# Patient Record
Sex: FEMALE | Race: WHITE | NOT HISPANIC OR LATINO
[De-identification: names, ages, dates, MRNs, and addresses within clinical notes are randomized per-mention and may not be internally consistent; named-entity substitution may affect disease eponyms.]

---

## 2019-11-12 PROBLEM — Z00.00 ENCOUNTER FOR PREVENTIVE HEALTH EXAMINATION: Status: ACTIVE | Noted: 2019-11-12

## 2019-12-20 ENCOUNTER — APPOINTMENT (OUTPATIENT)
Dept: BARIATRICS | Facility: CLINIC | Age: 49
End: 2019-12-20
Payer: COMMERCIAL

## 2019-12-20 VITALS — SYSTOLIC BLOOD PRESSURE: 159 MMHG | HEART RATE: 79 BPM | DIASTOLIC BLOOD PRESSURE: 93 MMHG

## 2019-12-20 VITALS — HEIGHT: 62 IN | BODY MASS INDEX: 30.51 KG/M2 | WEIGHT: 165.8 LBS

## 2019-12-20 DIAGNOSIS — R53.83 OTHER FATIGUE: ICD-10-CM

## 2019-12-20 DIAGNOSIS — Z87.42 PERSONAL HISTORY OF OTHER DISEASES OF THE FEMALE GENITAL TRACT: ICD-10-CM

## 2019-12-20 DIAGNOSIS — G47.419 NARCOLEPSY W/OUT CATAPLEXY: ICD-10-CM

## 2019-12-20 PROCEDURE — 99205 OFFICE O/P NEW HI 60 MIN: CPT

## 2019-12-20 RX ORDER — ARMODAFINIL 200 MG/1
TABLET ORAL
Refills: 0 | Status: ACTIVE | COMMUNITY

## 2019-12-20 NOTE — HISTORY OF PRESENT ILLNESS
[FreeTextEntry1] : 49 year old female for medical weight loss consultation\par Patient has struggled with her weight for over 2 years\par Tried 21 day fix diet\par PMD- Dr. Evangelina Rivera at [facility]\par Sees a neurologist for narcolepsy \par Lowest adult weight 132\par HIghest weight 166\par Target weight 110 by IDW- d/w patient about healthy BMI weight 135\par Retired\par Exercise- stopped due to plantar fascitis which has resolved\par Water intake- adequate\par Food recall- B- turkey and lettuce D- grilled chicken salad- finds herself very hungry\par

## 2019-12-20 NOTE — ASSESSMENT
[FreeTextEntry1] : Patient will have labs drawn including 1mg dex suppresion test\par Behaivoral changes- will go to gym 3x a week, increase meals to 2-3x a daily with protein, limited complex carbs, and vegetables- possibility she isn't consuming enough during the day which is leading to feeling of severe hunger, will keep activity log to organize schedule and plan meals ahead of time\par If labs WNL can consider Saxenda- provided information to patient and disussed possible side effects including n/v/headache/GERD/pancreatitis/medullary thyroid cancer \par RTO 2 weeks

## 2020-01-09 ENCOUNTER — APPOINTMENT (OUTPATIENT)
Dept: BARIATRICS | Facility: CLINIC | Age: 50
End: 2020-01-09
Payer: COMMERCIAL

## 2020-01-09 VITALS
SYSTOLIC BLOOD PRESSURE: 142 MMHG | HEART RATE: 70 BPM | DIASTOLIC BLOOD PRESSURE: 86 MMHG | WEIGHT: 169 LBS | BODY MASS INDEX: 30.91 KG/M2

## 2020-01-09 PROCEDURE — 99214 OFFICE O/P EST MOD 30 MIN: CPT

## 2020-01-09 NOTE — HISTORY OF PRESENT ILLNESS
[FreeTextEntry1] : 49 year old female for medical weight loss consultation\par Patient has struggled with her weight for over 2 years\par Tried 21 day fix diet\par PMD- Dr. Evangelina Rivera at [facility]\par Sees a neurologist for narcolepsy \par Lowest adult weight 132\par HIghest weight 166\par Target weight 110 by IDW- d/w patient about healthy BMI weight 135\par Retired\par Exercise- stopped due to plantar fascitis which has resolved\par Water intake- adequate\par Food recall- B- turkey and lettuce D- grilled chicken salad- finds herself very hungry\par \par 1/9/20- Patient RTO but up 4 pounds.  Reports very strict diet- vegetable juicing with protein powder, chick peas, intermittent fasting.  Noticed some epigastric pain upon deep palpation.  Reviewed recent labs- WNL.  Denies signs of reflux, no edema, no diarrhea, constipation, nausea/vomiting.  +exercise intolerance.  \par

## 2020-01-09 NOTE — ASSESSMENT
[FreeTextEntry1] : Patient to f/u with her PMD for physical and to evaluate epigastric pain on deep palpation- may need further workup- discussed with patient at length \par Weight continues to increase- no signs of edema or secondary causes of weight gain\par Declined RD session\par Will start Saxenda- reviewed possible side effects including n/v/d/c/pancreatitis- provided medication information on last visit\par Patient injected herself in the office- pen teaching performed no reaction noted\par Sample [id number] Exp 5/2021\par RTO 1 month

## 2020-01-24 RX ORDER — PEN NEEDLE, DIABETIC 29 G X1/2"
32G X 4 MM NEEDLE, DISPOSABLE MISCELLANEOUS
Qty: 1 | Refills: 2 | Status: DISCONTINUED | COMMUNITY
Start: 2020-01-09 | End: 2020-01-24

## 2020-01-24 RX ORDER — DEXAMETHASONE 1 MG/1
1 TABLET ORAL
Qty: 1 | Refills: 0 | Status: DISCONTINUED | COMMUNITY
Start: 2019-12-20 | End: 2020-01-24

## 2020-01-24 RX ORDER — LIRAGLUTIDE 6 MG/ML
18 INJECTION, SOLUTION SUBCUTANEOUS
Qty: 1 | Refills: 5 | Status: DISCONTINUED | COMMUNITY
Start: 2020-01-09 | End: 2020-01-24

## 2020-01-24 RX ORDER — LIRAGLUTIDE 6 MG/ML
18 INJECTION SUBCUTANEOUS DAILY
Qty: 1 | Refills: 5 | Status: DISCONTINUED | COMMUNITY
Start: 2020-01-17 | End: 2020-01-24

## 2020-01-24 RX ORDER — DULAGLUTIDE 0.75 MG/.5ML
0.75 INJECTION, SOLUTION SUBCUTANEOUS
Qty: 1 | Refills: 5 | Status: DISCONTINUED | COMMUNITY
Start: 2020-01-24 | End: 2020-01-24

## 2020-02-06 ENCOUNTER — APPOINTMENT (OUTPATIENT)
Dept: BARIATRICS | Facility: CLINIC | Age: 50
End: 2020-02-06
Payer: COMMERCIAL

## 2020-02-06 VITALS — SYSTOLIC BLOOD PRESSURE: 145 MMHG | DIASTOLIC BLOOD PRESSURE: 100 MMHG | HEART RATE: 75 BPM

## 2020-02-06 DIAGNOSIS — J45.909 UNSPECIFIED ASTHMA, UNCOMPLICATED: ICD-10-CM

## 2020-02-06 PROCEDURE — 99214 OFFICE O/P EST MOD 30 MIN: CPT

## 2020-02-06 RX ORDER — ISOPROPYL ALCOHOL 70 ML/100ML
SWAB TOPICAL
Qty: 1 | Refills: 3 | Status: DISCONTINUED | COMMUNITY
Start: 2020-01-09 | End: 2020-02-06

## 2020-02-06 RX ORDER — ALBUTEROL SULFATE 90 UG/1
108 (90 BASE) AEROSOL, METERED RESPIRATORY (INHALATION) EVERY 4 HOURS
Refills: 0 | Status: ACTIVE | COMMUNITY
Start: 2020-02-06

## 2020-02-06 NOTE — HISTORY OF PRESENT ILLNESS
[FreeTextEntry1] : 49 year old female for medical weight loss consultation\par Patient has struggled with her weight for over 2 years\par Tried 21 day fix diet\par PMD- Dr. Evangelina Rivera at [facility]\par Sees a neurologist for narcolepsy \par Lowest adult weight 132\par HIghest weight 166\par Target weight 110 by IDW- d/w patient about healthy BMI weight 135\par Retired\par Exercise- stopped due to plantar fascitis which has resolved\par Water intake- adequate\par Food recall- B- turkey and lettuce D- grilled chicken salad- finds herself very hungry\par \par 1/9/20- Patient RTO but up 4 pounds.  Reports very strict diet- vegetable juicing with protein powder, chick peas, intermittent fasting.  Noticed some epigastric pain upon deep palpation.  Reviewed recent labs- WNL.  Denies signs of reflux, no edema, no diarrhea, constipation, nausea/vomiting.  +exercise intolerance.  \par \par 2/6/20- Patient RTO feeling well.  Had to stop taking the Saxenda because she ran out- had lost 15 pounds now with a 4 pound weight regain since coming off the medication.  Taking metformin 3 tabs/day without any noticeable appetite suppression.  Increase in cravings and appetite since stopping Saxenda and would very much like to restart.  Not exercising regularly.  Weight today 158.  Episode of dizziness since stopping the Saxenda- has had these previously when she hasn't eaten and gets very hungry.  Treated with simple carbohydrates.  \par

## 2020-02-06 NOTE — PHYSICAL EXAM
[Normal] : affect appropriate [Obese, well nourished, in no acute distress] : obese, well nourished, in no acute distress

## 2020-02-06 NOTE — ASSESSMENT
[FreeTextEntry1] : Will d/c metformin- check fasting c-peptide, insulin and glucose levels h/o elevated FBG without diabetes or prediabetes by HbA1C- discussed proper treatment and prevention of hypogylcemia\par Would benefit from GLP-1RA for weight loss, FBG, and reactive hypoglycemia\par WIll try again to obtain PA using [facility] [employer] benefits\par If unable- can try Bydureon without insurance coverage- reviewed proper injection technique video with patient\par Start consistent exercise program, declined RD \par RTO 1 month after restarting medications

## 2020-02-11 RX ORDER — BLOOD-GLUCOSE METER
W/DEVICE KIT MISCELLANEOUS
Qty: 1 | Refills: 0 | Status: ACTIVE | COMMUNITY
Start: 2020-02-11 | End: 1900-01-01

## 2020-02-20 ENCOUNTER — APPOINTMENT (OUTPATIENT)
Dept: BARIATRICS | Facility: CLINIC | Age: 50
End: 2020-02-20
Payer: COMMERCIAL

## 2020-02-20 VITALS
BODY MASS INDEX: 29.74 KG/M2 | DIASTOLIC BLOOD PRESSURE: 86 MMHG | WEIGHT: 162.6 LBS | SYSTOLIC BLOOD PRESSURE: 128 MMHG | HEART RATE: 79 BPM

## 2020-02-20 PROCEDURE — 99214 OFFICE O/P EST MOD 30 MIN: CPT

## 2020-02-20 NOTE — ASSESSMENT
[FreeTextEntry1] : Patient taught how to use glucometer today- BG in office 87mg/dL.  Expressed understanding of instructions.  Will self monitor BG levels if feeling hypoglycemic to see if BG is actually <70mg/dL.  If it is under 70 she will treat with 15g simple carbohydrate and recheck to make sure it resolves.  \par Patient will call or message me in a week on the portal to let me know her progress.  If during this one week trial she does not show objective evidence of hypoglycemia we can prove there is no need for her to eat large amounts of carbohydrates to maintain her BG which has contributed to her weight gain.  At that point we can also restart her metformin and start Bydureon.  \par

## 2020-02-20 NOTE — HISTORY OF PRESENT ILLNESS
[FreeTextEntry1] : 49 year old female for medical weight loss consultation\par Patient has struggled with her weight for over 2 years\par Tried 21 day fix diet\par PMD- Dr. Evangelina Rivera at [facility]\par Sees a neurologist for narcolepsy \par Lowest adult weight 132\par HIghest weight 166\par Target weight 110 by IDW- d/w patient about healthy BMI weight 135\par Retired\par Exercise- stopped due to plantar fascitis which has resolved\par Water intake- adequate\par Food recall- B- turkey and lettuce D- grilled chicken salad- finds herself very hungry\par \par 1/9/20- Patient RTO but up 4 pounds.  Reports very strict diet- vegetable juicing with protein powder, chick peas, intermittent fasting.  Noticed some epigastric pain upon deep palpation.  Reviewed recent labs- WNL.  Denies signs of reflux, no edema, no diarrhea, constipation, nausea/vomiting.  +exercise intolerance.  \par \par 2/6/20- Patient RTO feeling well.  Had to stop taking the Saxenda because she ran out- had lost 15 pounds now with a 4 pound weight regain since coming off the medication.  Taking metformin 3 tabs/day without any noticeable appetite suppression.  Increase in cravings and appetite since stopping Saxenda and would very much like to restart.  Not exercising regularly.  Weight today 158.  Episode of dizziness since stopping the Saxenda- has had these previously when she hasn't eaten and gets very hungry.  Treated with simple carbohydrates.  \par \par 2/20/20- Patient continues to feel out of control with her eating- ate cupcake last night overnight.  Having subjective episodes of hypogylcemia picked up glucose tablets and glucometer but hasn't used it yet.

## 2020-03-20 RX ORDER — METFORMIN ER 500 MG 500 MG/1
500 TABLET ORAL
Qty: 30 | Refills: 1 | Status: DISCONTINUED | COMMUNITY
Start: 2020-01-24 | End: 2020-03-20

## 2020-11-05 ENCOUNTER — RX RENEWAL (OUTPATIENT)
Age: 50
End: 2020-11-05

## 2020-12-31 ENCOUNTER — RX RENEWAL (OUTPATIENT)
Age: 50
End: 2020-12-31

## 2021-05-26 ENCOUNTER — RX RENEWAL (OUTPATIENT)
Age: 51
End: 2021-05-26

## 2021-06-17 ENCOUNTER — APPOINTMENT (OUTPATIENT)
Dept: BARIATRICS | Facility: CLINIC | Age: 51
End: 2021-06-17

## 2021-06-25 ENCOUNTER — APPOINTMENT (OUTPATIENT)
Dept: BARIATRICS | Facility: CLINIC | Age: 51
End: 2021-06-25
Payer: COMMERCIAL

## 2021-06-25 VITALS — WEIGHT: 166.6 LBS | BODY MASS INDEX: 30.47 KG/M2

## 2021-06-25 DIAGNOSIS — E16.1 OTHER HYPOGLYCEMIA: ICD-10-CM

## 2021-06-25 DIAGNOSIS — Z87.39 PERSONAL HISTORY OF OTHER DISEASES OF THE MUSCULOSKELETAL SYSTEM AND CONNECTIVE TISSUE: ICD-10-CM

## 2021-06-25 DIAGNOSIS — R73.01 IMPAIRED FASTING GLUCOSE: ICD-10-CM

## 2021-06-25 PROCEDURE — 99214 OFFICE O/P EST MOD 30 MIN: CPT | Mod: 95

## 2021-06-25 RX ORDER — VENLAFAXINE 37.5 MG/1
TABLET ORAL
Refills: 0 | Status: DISCONTINUED | COMMUNITY
End: 2021-06-25

## 2021-06-25 RX ORDER — PREGABALIN 300 MG/1
CAPSULE ORAL
Refills: 0 | Status: ACTIVE | COMMUNITY

## 2021-06-25 RX ORDER — PANTOPRAZOLE 40 MG/1
TABLET, DELAYED RELEASE ORAL
Refills: 0 | Status: DISCONTINUED | COMMUNITY
End: 2021-06-25

## 2021-06-25 NOTE — ASSESSMENT
[FreeTextEntry1] : 51 year old female with class I obesity\par Patient to have fasting labs drawn \par Discussed lifestyle changes back to what she was doing in the past that was helpful: Focus on protein sources, complex carbs, and vegetables\par Exercise- start walking daily.  F/u with pulmonologist as needed\par Can discuss medications at next visit- may consider topiramate which may also help with insomnia, plenity, or continuing Bydureon.  Patient does not have insurance coverage for GLP-1RA's or obesity medicatoins\par RTO 2 weeks

## 2021-06-25 NOTE — HISTORY OF PRESENT ILLNESS
[FreeTextEntry1] : 49 year old female for medical weight loss consultation\par Patient has struggled with her weight for over 2 years\par Tried 21 day fix diet\par PMD- Dr. Evangelina Rivera at [facility]\par Sees a neurologist for narcolepsy \par Lowest adult weight 132\par HIghest weight 166\par Target weight 110 by IDW- d/w patient about healthy BMI weight 135\par Retired\par Exercise- stopped due to plantar fascitis which has resolved\par Water intake- adequate\par Food recall- B- turkey and lettuce D- grilled chicken salad- finds herself very hungry\par \par 1/9/20- Patient RTO but up 4 pounds.  Reports very strict diet- vegetable juicing with protein powder, chick peas, intermittent fasting.  Noticed some epigastric pain upon deep palpation.  Reviewed recent labs- WNL.  Denies signs of reflux, no edema, no diarrhea, constipation, nausea/vomiting.  +exercise intolerance.  \par \par 2/6/20- Patient RTO feeling well.  Had to stop taking the Saxenda because she ran out- had lost 15 pounds now with a 4 pound weight regain since coming off the medication.  Taking metformin 3 tabs/day without any noticeable appetite suppression.  Increase in cravings and appetite since stopping Saxenda and would very much like to restart.  Not exercising regularly.  Weight today 158.  Episode of dizziness since stopping the Saxenda- has had these previously when she hasn't eaten and gets very hungry.  Treated with simple carbohydrates.  \par \par 2/20/20- Patient continues to feel out of control with her eating- ate cupcake last night overnight.  Having subjective episodes of hypogylcemia picked up glucose tablets and glucometer but hasn't used it yet. \par \par 6/25/21- Patient to reestablish care.  +4 pound since our last vist together >1 year ago.  Stopped Bydureon and metformin.  Lowest weight had been 160.8 in April 2020.  At the time had been eating differently- potato in the morning with vegetables which satisfied her- much less rice.  Does not eat eggs or dairy.  Not exercising consistently due to BARNARD- believes this is due to her asthma.  Planning to f/u with her pulmonologist- reports normal cardiac testing recently.  +perceived hypoglycemic episodes 5x a month- not tied to physical activity, meals, or specific time of day.  Has not used glucometer to check her levels during the episodes but relieves them with glucose tablets.

## 2021-07-09 ENCOUNTER — APPOINTMENT (OUTPATIENT)
Dept: BARIATRICS | Facility: CLINIC | Age: 51
End: 2021-07-09
Payer: COMMERCIAL

## 2021-07-09 VITALS — WEIGHT: 166 LBS | BODY MASS INDEX: 30.36 KG/M2

## 2021-07-09 LAB
ALBUMIN SERPL ELPH-MCNC: 4.6 G/DL
ALP BLD-CCNC: 107 U/L
ALT SERPL-CCNC: 16 U/L
ANION GAP SERPL CALC-SCNC: 11 MMOL/L
AST SERPL-CCNC: 21 U/L
BASOPHILS # BLD AUTO: 0.06 K/UL
BASOPHILS NFR BLD AUTO: 1.1 %
BILIRUB SERPL-MCNC: 0.6 MG/DL
BUN SERPL-MCNC: 9 MG/DL
CALCIUM SERPL-MCNC: 9.8 MG/DL
CHLORIDE SERPL-SCNC: 103 MMOL/L
CHOLEST SERPL-MCNC: 198 MG/DL
CO2 SERPL-SCNC: 26 MMOL/L
CREAT SERPL-MCNC: 0.72 MG/DL
EOSINOPHIL # BLD AUTO: 0.03 K/UL
EOSINOPHIL NFR BLD AUTO: 0.6 %
ESTIMATED AVERAGE GLUCOSE: 105 MG/DL
GLUCOSE SERPL-MCNC: 94 MG/DL
HBA1C MFR BLD HPLC: 5.3 %
HCT VFR BLD CALC: 39.2 %
HDLC SERPL-MCNC: 67 MG/DL
HGB BLD-MCNC: 12.7 G/DL
IMM GRANULOCYTES NFR BLD AUTO: 0 %
INSULIN P FAST SERPL-ACNC: 3.7 UU/ML
LDLC SERPL CALC-MCNC: 116 MG/DL
LYMPHOCYTES # BLD AUTO: 1.85 K/UL
LYMPHOCYTES NFR BLD AUTO: 35 %
MAN DIFF?: NORMAL
MCHC RBC-ENTMCNC: 32.2 PG
MCHC RBC-ENTMCNC: 32.4 GM/DL
MCV RBC AUTO: 99.2 FL
MONOCYTES # BLD AUTO: 0.41 K/UL
MONOCYTES NFR BLD AUTO: 7.8 %
NEUTROPHILS # BLD AUTO: 2.93 K/UL
NEUTROPHILS NFR BLD AUTO: 55.5 %
NONHDLC SERPL-MCNC: 131 MG/DL
PLATELET # BLD AUTO: 236 K/UL
POTASSIUM SERPL-SCNC: 4.7 MMOL/L
PROT SERPL-MCNC: 7.2 G/DL
RBC # BLD: 3.95 M/UL
RBC # FLD: 13.9 %
SODIUM SERPL-SCNC: 141 MMOL/L
TRIGL SERPL-MCNC: 73 MG/DL
TSH SERPL-ACNC: 1.43 UIU/ML
WBC # FLD AUTO: 5.28 K/UL

## 2021-07-09 PROCEDURE — 99213 OFFICE O/P EST LOW 20 MIN: CPT | Mod: 95

## 2021-07-09 NOTE — HISTORY OF PRESENT ILLNESS
[FreeTextEntry1] : 49 year old female for medical weight loss consultation\par Patient has struggled with her weight for over 2 years\par Tried 21 day fix diet\par PMD- Dr. Evangelina Rivera at [facility]\par Sees a neurologist for narcolepsy \par Lowest adult weight 132\par HIghest weight 166\par Target weight 110 by IDW- d/w patient about healthy BMI weight 135\par Retired\par Exercise- stopped due to plantar fascitis which has resolved\par Water intake- adequate\par Food recall- B- turkey and lettuce D- grilled chicken salad- finds herself very hungry\par \par 1/9/20- Patient RTO but up 4 pounds.  Reports very strict diet- vegetable juicing with protein powder, chick peas, intermittent fasting.  Noticed some epigastric pain upon deep palpation.  Reviewed recent labs- WNL.  Denies signs of reflux, no edema, no diarrhea, constipation, nausea/vomiting.  +exercise intolerance.  \par \par 2/6/20- Patient RTO feeling well.  Had to stop taking the Saxenda because she ran out- had lost 15 pounds now with a 4 pound weight regain since coming off the medication.  Taking metformin 3 tabs/day without any noticeable appetite suppression.  Increase in cravings and appetite since stopping Saxenda and would very much like to restart.  Not exercising regularly.  Weight today 158.  Episode of dizziness since stopping the Saxenda- has had these previously when she hasn't eaten and gets very hungry.  Treated with simple carbohydrates.  \par \par 2/20/20- Patient continues to feel out of control with her eating- ate cupcake last night overnight.  Having subjective episodes of hypogylcemia picked up glucose tablets and glucometer but hasn't used it yet. \par \par 6/25/21- Patient to reestablish care.  +4 pound since our last vist together >1 year ago.  Stopped Bydureon and metformin.  Lowest weight had been 160.8 in April 2020.  At the time had been eating differently- potato in the morning with vegetables which satisfied her- much less rice.  Does not eat eggs or dairy.  Not exercising consistently due to BARNARD- believes this is due to her asthma.  Planning to f/u with her pulmonologist- reports normal cardiac testing recently.  +perceived hypoglycemic episodes 5x a month- not tied to physical activity, meals, or specific time of day.  Has not used glucometer to check her levels during the episodes but relieves them with glucose tablets.  \par \par 7/9/21- Patient -0.5 pound.  Started walking on her treadmill yesterday.  Concerned that when she eats she feels so out of control.  Feels better stopping eating completely.  Wants to start juicing again.  Searching for help to give her back control when she eats.

## 2021-07-09 NOTE — ASSESSMENT
[FreeTextEntry1] : 51 year old female with class I obesity\par Discussed importance of a balanced diet, maintaining AT LEAST 1000 kcal/day with about 1200kcal for weight loss to be recommended.  Patient will start juicing again, concentrate on adequate protein, and fluid, and can have a moderate amount of complex carbs\par Continue to increase exercise regimen\par Recommended for her to start plenity.  Sent link to website\par Can also consider topiramate in the future off label\par RTO 1 month

## 2021-07-10 ENCOUNTER — NON-APPOINTMENT (OUTPATIENT)
Age: 51
End: 2021-07-10

## 2021-08-06 ENCOUNTER — APPOINTMENT (OUTPATIENT)
Dept: BARIATRICS | Facility: CLINIC | Age: 51
End: 2021-08-06
Payer: COMMERCIAL

## 2021-08-06 VITALS — WEIGHT: 166 LBS | BODY MASS INDEX: 30.36 KG/M2

## 2021-08-06 PROCEDURE — 99443: CPT | Mod: 95

## 2021-08-19 ENCOUNTER — APPOINTMENT (OUTPATIENT)
Dept: BARIATRICS | Facility: CLINIC | Age: 51
End: 2021-08-19
Payer: COMMERCIAL

## 2021-08-19 VITALS — BODY MASS INDEX: 30.36 KG/M2 | WEIGHT: 166 LBS

## 2021-08-19 PROCEDURE — 99213 OFFICE O/P EST LOW 20 MIN: CPT | Mod: 95

## 2021-08-19 NOTE — ASSESSMENT
[FreeTextEntry1] : 51 year old female with class I obesity\par Patient will start plenity- discussed dosage and timing of medication.  I will send to mail order and she is aware it is not through insurance.  Discussed possible GI side effects.  \par Emphasized importance of balanced diet- increase protein and fruits.  Avoid cutting calories too much as to not slow down her metabolism long term\par Increase water intake\par F/U 1 month

## 2021-08-19 NOTE — HISTORY OF PRESENT ILLNESS
[FreeTextEntry1] : 49 year old female for medical weight loss consultation\par Patient has struggled with her weight for over 2 years\par Tried 21 day fix diet\par PMD- Dr. Evangelina Rivera at [facility]\par Sees a neurologist for narcolepsy \par Lowest adult weight 132\par HIghest weight 166\par Target weight 110 by IDW- d/w patient about healthy BMI weight 135\par Retired\par Exercise- stopped due to plantar fascitis which has resolved\par Water intake- adequate\par Food recall- B- turkey and lettuce D- grilled chicken salad- finds herself very hungry\par \par 1/9/20- Patient RTO but up 4 pounds.  Reports very strict diet- vegetable juicing with protein powder, chick peas, intermittent fasting.  Noticed some epigastric pain upon deep palpation.  Reviewed recent labs- WNL.  Denies signs of reflux, no edema, no diarrhea, constipation, nausea/vomiting.  +exercise intolerance.  \par \par 2/6/20- Patient RTO feeling well.  Had to stop taking the Saxenda because she ran out- had lost 15 pounds now with a 4 pound weight regain since coming off the medication.  Taking metformin 3 tabs/day without any noticeable appetite suppression.  Increase in cravings and appetite since stopping Saxenda and would very much like to restart.  Not exercising regularly.  Weight today 158.  Episode of dizziness since stopping the Saxenda- has had these previously when she hasn't eaten and gets very hungry.  Treated with simple carbohydrates.  \par \par 2/20/20- Patient continues to feel out of control with her eating- ate cupcake last night overnight.  Having subjective episodes of hypogylcemia picked up glucose tablets and glucometer but hasn't used it yet. \par \par 6/25/21- Patient to reestablish care.  +4 pound since our last vist together >1 year ago.  Stopped Bydureon and metformin.  Lowest weight had been 160.8 in April 2020.  At the time had been eating differently- potato in the morning with vegetables which satisfied her- much less rice.  Does not eat eggs or dairy.  Not exercising consistently due to BARNARD- believes this is due to her asthma.  Planning to f/u with her pulmonologist- reports normal cardiac testing recently.  +perceived hypoglycemic episodes 5x a month- not tied to physical activity, meals, or specific time of day.  Has not used glucometer to check her levels during the episodes but relieves them with glucose tablets.  \par \par 7/9/21- Patient -0.5 pound.  Started walking on her treadmill yesterday.  Concerned that when she eats she feels so out of control.  Feels better stopping eating completely.  Wants to start juicing again.  Searching for help to give her back control when she eats.  \par \par 8/6/21-weight is stable. Exercising- walking 4-5 miles/day and then going to the gym every afternoon.  Highly motivated.  +Hunger in the evenings difficult to control.  Taking a new medication for her narcolepsy.  \par \par 8/19/21- Patient's weight stable.  Still struggling and frustrated.  No coverage for GLP-1RA's through insurance.

## 2021-09-03 ENCOUNTER — APPOINTMENT (OUTPATIENT)
Dept: BARIATRICS | Facility: CLINIC | Age: 51
End: 2021-09-03
Payer: COMMERCIAL

## 2021-09-03 VITALS — WEIGHT: 168.6 LBS | BODY MASS INDEX: 30.84 KG/M2

## 2021-09-03 PROCEDURE — 99442: CPT | Mod: 95

## 2021-09-03 NOTE — ASSESSMENT
[FreeTextEntry1] : 51 year old female with class I obesity\par Continue plenity but work on consistency with meal timing and medication.  Target 1200-1400kcal/day\par [facility]\par Declined RD visit.  \par Continue good hydration\par F/U with pulmonology

## 2021-09-03 NOTE — HISTORY OF PRESENT ILLNESS
[Home] : at home, [unfilled] , at the time of the visit. [Other Location: e.g. Home (Enter Location, City,State)___] : at [unfilled] [Verbal consent obtained from patient] : the patient, [unfilled] [FreeTextEntry1] : 49 year old female for medical weight loss consultation\par Patient has struggled with her weight for over 2 years\par Tried 21 day fix diet\par PMD- Dr. Evangelina Rivera at [facility]\par Sees a neurologist for narcolepsy \par Lowest adult weight 132\par HIghest weight 166\par Target weight 110 by IDW- d/w patient about healthy BMI weight 135\par Retired\par Exercise- stopped due to plantar fascitis which has resolved\par Water intake- adequate\par Food recall- B- turkey and lettuce D- grilled chicken salad- finds herself very hungry\par \par 1/9/20- Patient RTO but up 4 pounds.  Reports very strict diet- vegetable juicing with protein powder, chick peas, intermittent fasting.  Noticed some epigastric pain upon deep palpation.  Reviewed recent labs- WNL.  Denies signs of reflux, no edema, no diarrhea, constipation, nausea/vomiting.  +exercise intolerance.  \par \par 2/6/20- Patient RTO feeling well.  Had to stop taking the Saxenda because she ran out- had lost 15 pounds now with a 4 pound weight regain since coming off the medication.  Taking metformin 3 tabs/day without any noticeable appetite suppression.  Increase in cravings and appetite since stopping Saxenda and would very much like to restart.  Not exercising regularly.  Weight today 158.  Episode of dizziness since stopping the Saxenda- has had these previously when she hasn't eaten and gets very hungry.  Treated with simple carbohydrates.  \par \par 2/20/20- Patient continues to feel out of control with her eating- ate cupcake last night overnight.  Having subjective episodes of hypogylcemia picked up glucose tablets and glucometer but hasn't used it yet. \par \par 6/25/21- Patient to reestablish care.  +4 pound since our last vist together >1 year ago.  Stopped Bydureon and metformin.  Lowest weight had been 160.8 in April 2020.  At the time had been eating differently- potato in the morning with vegetables which satisfied her- much less rice.  Does not eat eggs or dairy.  Not exercising consistently due to BARNARD- believes this is due to her asthma.  Planning to f/u with her pulmonologist- reports normal cardiac testing recently.  +perceived hypoglycemic episodes 5x a month- not tied to physical activity, meals, or specific time of day.  Has not used glucometer to check her levels during the episodes but relieves them with glucose tablets.  \par \par 7/9/21- Patient -0.5 pound.  Started walking on her treadmill yesterday.  Concerned that when she eats she feels so out of control.  Feels better stopping eating completely.  Wants to start juicing again.  Searching for help to give her back control when she eats.  \par \par 8/6/21-weight is stable. Exercising- walking 4-5 miles/day and then going to the gym every afternoon.  Highly motivated.  +Hunger in the evenings difficult to control.  Taking a new medication for her narcolepsy.  \par \par 8/19/21- Patient's weight stable.  Still struggling and frustrated.  No coverage for GLP-1RA's through insurance.  \par \par 9/1/21- Patient started plenity but has not been consistent.  Walking 3-4 miles/day.  Eating mostly breakfast and lunch, high protein and vegetable.  +2 pounds.  No negative side effects of plenity but some BARNARD requiring her asthma inhaler

## 2021-09-17 ENCOUNTER — APPOINTMENT (OUTPATIENT)
Dept: BARIATRICS | Facility: CLINIC | Age: 51
End: 2021-09-17
Payer: COMMERCIAL

## 2021-09-17 VITALS — BODY MASS INDEX: 30.4 KG/M2 | WEIGHT: 166.2 LBS

## 2021-09-17 PROCEDURE — 99442: CPT | Mod: 95

## 2021-09-17 NOTE — HISTORY OF PRESENT ILLNESS
[FreeTextEntry1] : 49 year old female for medical weight loss consultation\par Patient has struggled with her weight for over 2 years\par Tried 21 day fix diet\par PMD- Dr. Evangelina Rivera at [facility]\par Sees a neurologist for narcolepsy \par Lowest adult weight 132\par HIghest weight 166\par Target weight 110 by IDW- d/w patient about healthy BMI weight 135\par Retired\par Exercise- stopped due to plantar fascitis which has resolved\par Water intake- adequate\par Food recall- B- turkey and lettuce D- grilled chicken salad- finds herself very hungry\par \par 1/9/20- Patient RTO but up 4 pounds.  Reports very strict diet- vegetable juicing with protein powder, chick peas, intermittent fasting.  Noticed some epigastric pain upon deep palpation.  Reviewed recent labs- WNL.  Denies signs of reflux, no edema, no diarrhea, constipation, nausea/vomiting.  +exercise intolerance.  \par \par 2/6/20- Patient RTO feeling well.  Had to stop taking the Saxenda because she ran out- had lost 15 pounds now with a 4 pound weight regain since coming off the medication.  Taking metformin 3 tabs/day without any noticeable appetite suppression.  Increase in cravings and appetite since stopping Saxenda and would very much like to restart.  Not exercising regularly.  Weight today 158.  Episode of dizziness since stopping the Saxenda- has had these previously when she hasn't eaten and gets very hungry.  Treated with simple carbohydrates.  \par \par 2/20/20- Patient continues to feel out of control with her eating- ate cupcake last night overnight.  Having subjective episodes of hypogylcemia picked up glucose tablets and glucometer but hasn't used it yet. \par \par 6/25/21- Patient to reestablish care.  +4 pound since our last vist together >1 year ago.  Stopped Bydureon and metformin.  Lowest weight had been 160.8 in April 2020.  At the time had been eating differently- potato in the morning with vegetables which satisfied her- much less rice.  Does not eat eggs or dairy.  Not exercising consistently due to BARNARD- believes this is due to her asthma.  Planning to f/u with her pulmonologist- reports normal cardiac testing recently.  +perceived hypoglycemic episodes 5x a month- not tied to physical activity, meals, or specific time of day.  Has not used glucometer to check her levels during the episodes but relieves them with glucose tablets.  \par \par 7/9/21- Patient -0.5 pound.  Started walking on her treadmill yesterday.  Concerned that when she eats she feels so out of control.  Feels better stopping eating completely.  Wants to start juicing again.  Searching for help to give her back control when she eats.  \par \par 8/6/21-weight is stable. Exercising- walking 4-5 miles/day and then going to the gym every afternoon.  Highly motivated.  +Hunger in the evenings difficult to control.  Taking a new medication for her narcolepsy.  \par \par 8/19/21- Patient's weight stable.  Still struggling and frustrated.  No coverage for GLP-1RA's through insurance.  \par \par 9/1/21- Patient started plenity but has not been consistent.  Walking 3-4 miles/day.  Eating mostly breakfast and lunch, high protein and vegetable.  +2 pounds.  No negative side effects of plenity but some BARNARD requiring her asthma inhaler\par \par 9/17/21- Patient -2 pounds.  Had been as high as 170 because she was dealing with severe migraine headaches.  Seeing a neurologist who stated her on Ubrelvy.  Going to refocus on weight loss now and start exercising again.

## 2021-09-17 NOTE — ASSESSMENT
[FreeTextEntry1] : 51 year old female with class I obesity\par Reinforced lifestyle changes to help lose weight- will begin tracking and exericse regimen again\par Restart plenity now that she is feeling better\par F/U 1 month

## 2021-09-22 ENCOUNTER — APPOINTMENT (OUTPATIENT)
Dept: BARIATRICS | Facility: CLINIC | Age: 51
End: 2021-09-22

## 2021-10-15 ENCOUNTER — APPOINTMENT (OUTPATIENT)
Dept: BARIATRICS | Facility: CLINIC | Age: 51
End: 2021-10-15
Payer: COMMERCIAL

## 2021-10-15 VITALS — WEIGHT: 161.8 LBS | BODY MASS INDEX: 29.59 KG/M2

## 2021-10-15 DIAGNOSIS — E66.9 OBESITY, UNSPECIFIED: ICD-10-CM

## 2021-10-15 PROCEDURE — 99213 OFFICE O/P EST LOW 20 MIN: CPT | Mod: 95

## 2021-10-15 NOTE — HISTORY OF PRESENT ILLNESS
[FreeTextEntry1] : 49 year old female for medical weight loss consultation\par Patient has struggled with her weight for over 2 years\par Tried 21 day fix diet\par PMD- Dr. Evangelina Rivera at [facility]\par Sees a neurologist for narcolepsy \par Lowest adult weight 132\par HIghest weight 166\par Target weight 110 by IDW- d/w patient about healthy BMI weight 135\par Retired\par Exercise- stopped due to plantar fascitis which has resolved\par Water intake- adequate\par Food recall- B- turkey and lettuce D- grilled chicken salad- finds herself very hungry\par \par 1/9/20- Patient RTO but up 4 pounds.  Reports very strict diet- vegetable juicing with protein powder, chick peas, intermittent fasting.  Noticed some epigastric pain upon deep palpation.  Reviewed recent labs- WNL.  Denies signs of reflux, no edema, no diarrhea, constipation, nausea/vomiting.  +exercise intolerance.  \par \par 2/6/20- Patient RTO feeling well.  Had to stop taking the Saxenda because she ran out- had lost 15 pounds now with a 4 pound weight regain since coming off the medication.  Taking metformin 3 tabs/day without any noticeable appetite suppression.  Increase in cravings and appetite since stopping Saxenda and would very much like to restart.  Not exercising regularly.  Weight today 158.  Episode of dizziness since stopping the Saxenda- has had these previously when she hasn't eaten and gets very hungry.  Treated with simple carbohydrates.  \par \par 2/20/20- Patient continues to feel out of control with her eating- ate cupcake last night overnight.  Having subjective episodes of hypogylcemia picked up glucose tablets and glucometer but hasn't used it yet. \par \par 6/25/21- Patient to reestablish care.  +4 pound since our last vist together >1 year ago.  Stopped Bydureon and metformin.  Lowest weight had been 160.8 in April 2020.  At the time had been eating differently- potato in the morning with vegetables which satisfied her- much less rice.  Does not eat eggs or dairy.  Not exercising consistently due to BARNARD- believes this is due to her asthma.  Planning to f/u with her pulmonologist- reports normal cardiac testing recently.  +perceived hypoglycemic episodes 5x a month- not tied to physical activity, meals, or specific time of day.  Has not used glucometer to check her levels during the episodes but relieves them with glucose tablets.  \par \par 7/9/21- Patient -0.5 pound.  Started walking on her treadmill yesterday.  Concerned that when she eats she feels so out of control.  Feels better stopping eating completely.  Wants to start juicing again.  Searching for help to give her back control when she eats.  \par \par 8/6/21-weight is stable. Exercising- walking 4-5 miles/day and then going to the gym every afternoon.  Highly motivated.  +Hunger in the evenings difficult to control.  Taking a new medication for her narcolepsy.  \par \par 8/19/21- Patient's weight stable.  Still struggling and frustrated.  No coverage for GLP-1RA's through insurance.  \par \par 9/1/21- Patient started plenity but has not been consistent.  Walking 3-4 miles/day.  Eating mostly breakfast and lunch, high protein and vegetable.  +2 pounds.  No negative side effects of plenity but some BARNARD requiring her asthma inhaler\par \par 9/17/21- Patient -2 pounds.  Had been as high as 170 because she was dealing with severe migraine headaches.  Seeing a neurologist who stated her on Ubrelvy.  Going to refocus on weight loss now and start exercising again.\par \par 10/15/21- Patient -5 pounds.  Feeling well.  Has not been exercising though.  Feeling well on plenity.  Eating 2 meals/day.  Using alarm to remind her to take medication and then eat.  Good water intake.  Good satiety.  Eating non processed foods.

## 2021-10-15 NOTE — ASSESSMENT
[FreeTextEntry1] : 51 year old female overweight\par Continue plenity\par Continue regular meals 2-3x a day\par Focus on building exercise regimen this month\par F/U 1 month

## 2021-11-12 ENCOUNTER — APPOINTMENT (OUTPATIENT)
Dept: BARIATRICS/WEIGHT MGMT | Facility: CLINIC | Age: 51
End: 2021-11-12

## 2022-12-01 ENCOUNTER — APPOINTMENT (OUTPATIENT)
Dept: BARIATRICS/WEIGHT MGMT | Facility: CLINIC | Age: 52
End: 2022-12-01

## 2022-12-10 ENCOUNTER — APPOINTMENT (OUTPATIENT)
Dept: BARIATRICS/WEIGHT MGMT | Facility: CLINIC | Age: 52
End: 2022-12-10

## 2023-02-09 ENCOUNTER — APPOINTMENT (OUTPATIENT)
Dept: BARIATRICS/WEIGHT MGMT | Facility: CLINIC | Age: 53
End: 2023-02-09
Payer: COMMERCIAL

## 2023-02-09 VITALS — WEIGHT: 145.6 LBS | BODY MASS INDEX: 26.63 KG/M2

## 2023-02-09 VITALS — DIASTOLIC BLOOD PRESSURE: 81 MMHG | SYSTOLIC BLOOD PRESSURE: 123 MMHG

## 2023-02-09 DIAGNOSIS — E66.3 OVERWEIGHT: ICD-10-CM

## 2023-02-09 PROCEDURE — 99215 OFFICE O/P EST HI 40 MIN: CPT

## 2023-02-09 RX ORDER — OMEPRAZOLE 20 MG/1
20 CAPSULE, DELAYED RELEASE ORAL
Refills: 0 | Status: ACTIVE | COMMUNITY
Start: 2021-06-25

## 2023-02-09 RX ORDER — BLOOD SUGAR DIAGNOSTIC
STRIP MISCELLANEOUS DAILY
Qty: 1 | Refills: 5 | Status: DISCONTINUED | COMMUNITY
Start: 2020-02-13 | End: 2023-02-09

## 2023-02-09 RX ORDER — TOPIRAMATE 25 MG/1
25 TABLET, FILM COATED ORAL
Refills: 0 | Status: ACTIVE | COMMUNITY
Start: 2023-02-09

## 2023-02-09 RX ORDER — CARBOXYMETHYLCELLULOSE/CITRIC 0.75 G
CAPSULE ORAL
Qty: 180 | Refills: 5 | Status: DISCONTINUED | COMMUNITY
Start: 2021-08-19 | End: 2023-02-09

## 2023-02-09 RX ORDER — EXENATIDE 2 MG/.85ML
2 INJECTION, SUSPENSION, EXTENDED RELEASE SUBCUTANEOUS
Qty: 1 | Refills: 2 | Status: DISCONTINUED | COMMUNITY
Start: 2020-03-20 | End: 2023-02-09

## 2023-02-09 RX ORDER — PITOLISANT HYDROCHLORIDE 4.45 MG/1
4.45 TABLET, FILM COATED ORAL
Refills: 0 | Status: DISCONTINUED | COMMUNITY
Start: 2021-08-06 | End: 2023-02-09

## 2023-02-09 RX ORDER — LANCETS 28 GAUGE
EACH MISCELLANEOUS
Qty: 1 | Refills: 5 | Status: DISCONTINUED | COMMUNITY
Start: 2020-02-13 | End: 2023-02-09

## 2023-02-09 RX ORDER — SEMAGLUTIDE 1.34 MG/ML
2 INJECTION, SOLUTION SUBCUTANEOUS
Qty: 1 | Refills: 0 | Status: DISCONTINUED | COMMUNITY
Start: 2021-08-06 | End: 2023-02-09

## 2023-02-09 RX ORDER — ORAL SEMAGLUTIDE 3 MG/1
3 TABLET ORAL
Qty: 30 | Refills: 0 | Status: DISCONTINUED | COMMUNITY
Start: 2021-08-11 | End: 2023-02-09

## 2023-02-09 RX ORDER — CARBOXYMETHYLCELLULOSE/CITRIC 0.75 G
CAPSULE ORAL
Qty: 180 | Refills: 2 | Status: DISCONTINUED | COMMUNITY
Start: 2021-12-28 | End: 2023-02-09

## 2023-02-09 RX ORDER — ISOPROPYL ALCOHOL 70 ML/100ML
SWAB TOPICAL
Qty: 1 | Refills: 3 | Status: DISCONTINUED | COMMUNITY
Start: 2021-08-06 | End: 2023-02-09

## 2023-02-09 RX ORDER — PEN NEEDLE, DIABETIC 29 G X1/2"
32G X 4 MM NEEDLE, DISPOSABLE MISCELLANEOUS
Qty: 1 | Refills: 3 | Status: DISCONTINUED | COMMUNITY
Start: 2021-08-06 | End: 2023-02-09

## 2023-02-09 RX ORDER — BLOOD SUGAR DIAGNOSTIC
STRIP MISCELLANEOUS
Qty: 100 | Refills: 2 | Status: DISCONTINUED | COMMUNITY
Start: 2020-11-05 | End: 2023-02-09

## 2023-02-09 RX ORDER — FAMOTIDINE 20 MG/1
20 TABLET, FILM COATED ORAL
Refills: 0 | Status: ACTIVE | COMMUNITY
Start: 2023-02-09

## 2023-02-09 NOTE — ASSESSMENT
[FreeTextEntry1] : 52 year old female with h/o obesity now overweight with HPL, FLAVIA\par Discussed medical criteria for weight loss medication- BMI>27 with medical comorbid condition (HPL and FLAVIA) she wants to try semaglutide.  This has not been covered by her insurance in the past.  Her weight would need to be >148 pounds for BMI>27.  \par Encouraged her to increase her calories to at least 1100 daily by increasing her protein intake.  Encouraged her to work with our RD which she refused.  Encouraged her to track on MFP to make sure she is meeting her protein goals.  Discussed importance of adequate protein with exercise to help build lean muscle mass for long term weight loss success\par Continue adequate hydration \par May benefit from increase in topiramate, will discuss with her neurologist\par Target weight for BMI <25 <140 pounds- d/w patient \par She can message me if weight >148 pounds (she tracks her weight in an derrick connected to her scale)  if she would like me to send over Rx for Wegovy- emphasized importance of healthy eating changes as most important takeaway from today's visit

## 2023-02-09 NOTE — HISTORY OF PRESENT ILLNESS
[FreeTextEntry1] : 49 year old female for medical weight loss consultation\par Patient has struggled with her weight for over 2 years\par Tried 21 day fix diet\par PMD- Dr. Evangelina Rivera at [facility]\par Sees a neurologist for narcolepsy \par Lowest adult weight 132\par HIghest weight 166\par Target weight 110 by IDW- d/w patient about healthy BMI weight 135\par Retired\par Exercise- stopped due to plantar fascitis which has resolved\par Water intake- adequate\par Food recall- B- turkey and lettuce D- grilled chicken salad- finds herself very hungry\par \par 1/9/20- Patient RTO but up 4 pounds.  Reports very strict diet- vegetable juicing with protein powder, chick peas, intermittent fasting.  Noticed some epigastric pain upon deep palpation.  Reviewed recent labs- WNL.  Denies signs of reflux, no edema, no diarrhea, constipation, nausea/vomiting.  +exercise intolerance.  \par \par 2/6/20- Patient RTO feeling well.  Had to stop taking the Saxenda because she ran out- had lost 15 pounds now with a 4 pound weight regain since coming off the medication.  Taking metformin 3 tabs/day without any noticeable appetite suppression.  Increase in cravings and appetite since stopping Saxenda and would very much like to restart.  Not exercising regularly.  Weight today 158.  Episode of dizziness since stopping the Saxenda- has had these previously when she hasn't eaten and gets very hungry.  Treated with simple carbohydrates.  \par \par 2/20/20- Patient continues to feel out of control with her eating- ate cupcake last night overnight.  Having subjective episodes of hypogylcemia picked up glucose tablets and glucometer but hasn't used it yet. \par \par 6/25/21- Patient to reestablish care.  +4 pound since our last vist together >1 year ago.  Stopped Bydureon and metformin.  Lowest weight had been 160.8 in April 2020.  At the time had been eating differently- potato in the morning with vegetables which satisfied her- much less rice.  Does not eat eggs or dairy.  Not exercising consistently due to BARNARD- believes this is due to her asthma.  Planning to f/u with her pulmonologist- reports normal cardiac testing recently.  +perceived hypoglycemic episodes 5x a month- not tied to physical activity, meals, or specific time of day.  Has not used glucometer to check her levels during the episodes but relieves them with glucose tablets.  \par \par 7/9/21- Patient -0.5 pound.  Started walking on her treadmill yesterday.  Concerned that when she eats she feels so out of control.  Feels better stopping eating completely.  Wants to start juicing again.  Searching for help to give her back control when she eats.  \par \par 8/6/21-weight is stable. Exercising- walking 4-5 miles/day and then going to the gym every afternoon.  Highly motivated.  +Hunger in the evenings difficult to control.  Taking a new medication for her narcolepsy.  \par \par 8/19/21- Patient's weight stable.  Still struggling and frustrated.  No coverage for GLP-1RA's through insurance.  \par \par 9/1/21- Patient started plenity but has not been consistent.  Walking 3-4 miles/day.  Eating mostly breakfast and lunch, high protein and vegetable.  +2 pounds.  No negative side effects of plenity but some BARNARD requiring her asthma inhaler\par \par 9/17/21- Patient -2 pounds.  Had been as high as 170 because she was dealing with severe migraine headaches.  Seeing a neurologist who stated her on Ubrelvy.  Going to refocus on weight loss now and start exercising again.\par \par 10/15/21- Patient -5 pounds.  Feeling well.  Has not been exercising though.  Feeling well on plenity.  Eating 2 meals/day.  Using alarm to remind her to take medication and then eat.  Good water intake.  Good satiety.  Eating non processed foods.    \par \par 2/9/23- Patient RTO to reestablish care.  Has lost 20 pounds overall.  Has been exercising treadmill 1.5 hours/day and weights 3x a week.  Going to start yoga program.  Good water intake.  Eating 1 meal/day and fruit for snacks.  Has cut down on simple carb snacks.  Doing intermittent fasting 16/8.  Target weight 120 pounds.  Reveiwed recent labs on her phone from [facility] 1/2023 A1C 5.1%, Tchol 214, , liver and renal function WNL, no clinically relevant anemia, TSH WNL.  +FLAVIA with mouth guard.  Recently started topiramate 25mg/day by her neurologist for her migraines.